# Patient Record
Sex: FEMALE | Race: WHITE | Employment: PART TIME | URBAN - METROPOLITAN AREA
[De-identification: names, ages, dates, MRNs, and addresses within clinical notes are randomized per-mention and may not be internally consistent; named-entity substitution may affect disease eponyms.]

---

## 2018-05-09 PROBLEM — H91.93 BILATERAL HEARING LOSS, UNSPECIFIED HEARING LOSS TYPE: Status: ACTIVE | Noted: 2018-05-09

## 2018-05-22 PROBLEM — H90.3 SENSORINEURAL HEARING LOSS (SNHL) OF BOTH EARS: Status: ACTIVE | Noted: 2018-05-22

## 2021-05-13 ENCOUNTER — OFFICE VISIT (OUTPATIENT)
Dept: ORTHOPEDICS CLINIC | Facility: CLINIC | Age: 86
End: 2021-05-13
Payer: MEDICARE

## 2021-05-13 VITALS — HEIGHT: 62 IN | WEIGHT: 157 LBS | BODY MASS INDEX: 28.89 KG/M2

## 2021-05-13 DIAGNOSIS — B35.1 ONYCHOMYCOSIS: Primary | ICD-10-CM

## 2021-05-13 PROCEDURE — 99213 OFFICE O/P EST LOW 20 MIN: CPT | Performed by: PODIATRIST

## 2021-05-13 NOTE — PROGRESS NOTES
[facility] Progress Note    Subjective:   Mrs Lila Romano is here with bilateral great toenail issues and pain with the left depending on the shoes she is in.   She has had fungus for years ago involving both great toenails and she thinks originally it w

## 2023-08-04 ENCOUNTER — HOSPITAL ENCOUNTER (INPATIENT)
Facility: HOSPITAL | Age: 88
LOS: 4 days | Discharge: HOME HEALTH | End: 2023-08-08
Attending: EMERGENCY MEDICINE | Admitting: HOSPITALIST
Payer: MEDICARE

## 2023-08-04 ENCOUNTER — APPOINTMENT (OUTPATIENT)
Dept: GENERAL RADIOLOGY | Facility: HOSPITAL | Age: 88
End: 2023-08-04
Attending: EMERGENCY MEDICINE
Payer: MEDICARE

## 2023-08-04 DIAGNOSIS — I95.9 SEPSIS WITH HYPOTENSION: Primary | ICD-10-CM

## 2023-08-04 DIAGNOSIS — A41.9 SEPSIS WITH HYPOTENSION: Primary | ICD-10-CM

## 2023-08-04 LAB
ALBUMIN SERPL-MCNC: 2.8 G/DL (ref 3.4–5)
ALBUMIN/GLOB SERPL: 0.7 {RATIO} (ref 1–2)
ALP LIVER SERPL-CCNC: 116 U/L
ALT SERPL-CCNC: 19 U/L
ANION GAP SERPL CALC-SCNC: 4 MMOL/L (ref 0–18)
AST SERPL-CCNC: 18 U/L (ref 15–37)
BASOPHILS # BLD AUTO: 0.03 X10(3) UL (ref 0–0.2)
BASOPHILS NFR BLD AUTO: 0.4 %
BILIRUB SERPL-MCNC: 0.2 MG/DL (ref 0.1–2)
BILIRUB UR QL STRIP.AUTO: NEGATIVE
BUN BLD-MCNC: 23 MG/DL (ref 7–18)
CALCIUM BLD-MCNC: 9.7 MG/DL (ref 8.5–10.1)
CHLORIDE SERPL-SCNC: 102 MMOL/L (ref 98–112)
CLARITY UR REFRACT.AUTO: CLEAR
CO2 SERPL-SCNC: 32 MMOL/L (ref 21–32)
COLOR UR AUTO: YELLOW
CREAT BLD-MCNC: 1.07 MG/DL
EGFRCR SERPLBLD CKD-EPI 2021: 49 ML/MIN/1.73M2 (ref 60–?)
EOSINOPHIL # BLD AUTO: 0.02 X10(3) UL (ref 0–0.7)
EOSINOPHIL NFR BLD AUTO: 0.3 %
ERYTHROCYTE [DISTWIDTH] IN BLOOD BY AUTOMATED COUNT: 12.9 %
GLOBULIN PLAS-MCNC: 3.8 G/DL (ref 2.8–4.4)
GLUCOSE BLD-MCNC: 150 MG/DL (ref 70–99)
GLUCOSE UR STRIP.AUTO-MCNC: NEGATIVE MG/DL
HCT VFR BLD AUTO: 29.1 %
HGB BLD-MCNC: 9.2 G/DL
IMM GRANULOCYTES # BLD AUTO: 0.04 X10(3) UL (ref 0–1)
IMM GRANULOCYTES NFR BLD: 0.6 %
KETONES UR STRIP.AUTO-MCNC: NEGATIVE MG/DL
LACTATE SERPL-SCNC: 1 MMOL/L (ref 0.4–2)
LACTATE SERPL-SCNC: 2.5 MMOL/L (ref 0.4–2)
LEUKOCYTE ESTERASE UR QL STRIP.AUTO: NEGATIVE
LYMPHOCYTES # BLD AUTO: 0.45 X10(3) UL (ref 1–4)
LYMPHOCYTES NFR BLD AUTO: 6.5 %
MCH RBC QN AUTO: 28 PG (ref 26–34)
MCHC RBC AUTO-ENTMCNC: 31.6 G/DL (ref 31–37)
MCV RBC AUTO: 88.4 FL
MONOCYTES # BLD AUTO: 0.54 X10(3) UL (ref 0.1–1)
MONOCYTES NFR BLD AUTO: 7.8 %
NEUTROPHILS # BLD AUTO: 5.82 X10 (3) UL (ref 1.5–7.7)
NEUTROPHILS # BLD AUTO: 5.82 X10(3) UL (ref 1.5–7.7)
NEUTROPHILS NFR BLD AUTO: 84.4 %
NITRITE UR QL STRIP.AUTO: NEGATIVE
OSMOLALITY SERPL CALC.SUM OF ELEC: 293 MOSM/KG (ref 275–295)
PH UR STRIP.AUTO: 7 [PH] (ref 5–8)
PLATELET # BLD AUTO: 276 10(3)UL (ref 150–450)
POTASSIUM SERPL-SCNC: 4.1 MMOL/L (ref 3.5–5.1)
PROT SERPL-MCNC: 6.6 G/DL (ref 6.4–8.2)
PROT UR STRIP.AUTO-MCNC: NEGATIVE MG/DL
RBC # BLD AUTO: 3.29 X10(6)UL
RBC UR QL AUTO: NEGATIVE
SARS-COV-2 RNA RESP QL NAA+PROBE: NOT DETECTED
SODIUM SERPL-SCNC: 138 MMOL/L (ref 136–145)
SP GR UR STRIP.AUTO: 1.01 (ref 1–1.03)
UROBILINOGEN UR STRIP.AUTO-MCNC: <2 MG/DL
WBC # BLD AUTO: 6.9 X10(3) UL (ref 4–11)

## 2023-08-04 PROCEDURE — 80053 COMPREHEN METABOLIC PANEL: CPT | Performed by: EMERGENCY MEDICINE

## 2023-08-04 PROCEDURE — 93005 ELECTROCARDIOGRAM TRACING: CPT

## 2023-08-04 PROCEDURE — 83605 ASSAY OF LACTIC ACID: CPT | Performed by: EMERGENCY MEDICINE

## 2023-08-04 PROCEDURE — 87040 BLOOD CULTURE FOR BACTERIA: CPT | Performed by: EMERGENCY MEDICINE

## 2023-08-04 PROCEDURE — 71045 X-RAY EXAM CHEST 1 VIEW: CPT | Performed by: EMERGENCY MEDICINE

## 2023-08-04 PROCEDURE — 85025 COMPLETE CBC W/AUTO DIFF WBC: CPT | Performed by: EMERGENCY MEDICINE

## 2023-08-04 PROCEDURE — 81003 URINALYSIS AUTO W/O SCOPE: CPT | Performed by: EMERGENCY MEDICINE

## 2023-08-04 RX ORDER — OXYBUTYNIN CHLORIDE 5 MG/1
5 TABLET ORAL 2 TIMES DAILY
Status: DISCONTINUED | OUTPATIENT
Start: 2023-08-04 | End: 2023-08-08

## 2023-08-04 RX ORDER — ACETAMINOPHEN 500 MG
500 TABLET ORAL EVERY 4 HOURS PRN
Status: DISCONTINUED | OUTPATIENT
Start: 2023-08-04 | End: 2023-08-08

## 2023-08-04 RX ORDER — SODIUM CHLORIDE 9 MG/ML
INJECTION, SOLUTION INTRAVENOUS CONTINUOUS
Status: DISCONTINUED | OUTPATIENT
Start: 2023-08-04 | End: 2023-08-05

## 2023-08-04 RX ORDER — HEPARIN SODIUM 5000 [USP'U]/ML
5000 INJECTION, SOLUTION INTRAVENOUS; SUBCUTANEOUS EVERY 8 HOURS SCHEDULED
Status: DISCONTINUED | OUTPATIENT
Start: 2023-08-04 | End: 2023-08-08

## 2023-08-04 RX ORDER — METOCLOPRAMIDE HYDROCHLORIDE 5 MG/ML
5 INJECTION INTRAMUSCULAR; INTRAVENOUS EVERY 8 HOURS PRN
Status: DISCONTINUED | OUTPATIENT
Start: 2023-08-04 | End: 2023-08-08

## 2023-08-04 RX ORDER — ONDANSETRON 2 MG/ML
4 INJECTION INTRAMUSCULAR; INTRAVENOUS EVERY 6 HOURS PRN
Status: DISCONTINUED | OUTPATIENT
Start: 2023-08-04 | End: 2023-08-08

## 2023-08-04 RX ORDER — NITROFURANTOIN 25; 75 MG/1; MG/1
100 CAPSULE ORAL NIGHTLY
COMMUNITY

## 2023-08-04 RX ORDER — FAMOTIDINE 20 MG/1
20 TABLET, FILM COATED ORAL DAILY
Status: DISCONTINUED | OUTPATIENT
Start: 2023-08-05 | End: 2023-08-08

## 2023-08-04 NOTE — ED INITIAL ASSESSMENT (HPI)
Per EMS, pt from home. EMS reports pt initially hypotensive 80's/50's. Pt c/o feeling weak and faint that started last night. Reports feeling dizzy with movement. Normally on 3L of oxygen nasal cannula, hx lung cancer-not currently undergoing treatment. A&ox4. Has pleur-x to left side of abdomen. Received 500mL of 0.9NS IVF bolus in route.

## 2023-08-04 NOTE — ED QUICK NOTES
Rounding Completed    Plan of Care reviewed. Waiting for results/XR. Elimination needs assessed. Bed is locked and in lowest position. Call light within reach.

## 2023-08-04 NOTE — ED QUICK NOTES
Telephone update given to pt's son Danilo Rizo via telephone per ok from pt. He would like to be called with updates. #[phone].

## 2023-08-05 LAB
ANION GAP SERPL CALC-SCNC: 0 MMOL/L (ref 0–18)
ATRIAL RATE: 90 BPM
BASOPHILS # BLD AUTO: 0.02 X10(3) UL (ref 0–0.2)
BASOPHILS NFR BLD AUTO: 0.3 %
BUN BLD-MCNC: 18 MG/DL (ref 7–18)
CALCIUM BLD-MCNC: 8.8 MG/DL (ref 8.5–10.1)
CHLORIDE SERPL-SCNC: 108 MMOL/L (ref 98–112)
CO2 SERPL-SCNC: 31 MMOL/L (ref 21–32)
CREAT BLD-MCNC: 0.7 MG/DL
EGFRCR SERPLBLD CKD-EPI 2021: 82 ML/MIN/1.73M2 (ref 60–?)
EOSINOPHIL # BLD AUTO: 0.03 X10(3) UL (ref 0–0.7)
EOSINOPHIL NFR BLD AUTO: 0.5 %
ERYTHROCYTE [DISTWIDTH] IN BLOOD BY AUTOMATED COUNT: 12.7 %
GLUCOSE BLD-MCNC: 115 MG/DL (ref 70–99)
HCT VFR BLD AUTO: 23.7 %
HGB BLD-MCNC: 7.5 G/DL
IMM GRANULOCYTES # BLD AUTO: 0.02 X10(3) UL (ref 0–1)
IMM GRANULOCYTES NFR BLD: 0.3 %
LYMPHOCYTES # BLD AUTO: 0.56 X10(3) UL (ref 1–4)
LYMPHOCYTES NFR BLD AUTO: 9.6 %
MAGNESIUM SERPL-MCNC: 2.3 MG/DL (ref 1.6–2.6)
MCH RBC QN AUTO: 28.1 PG (ref 26–34)
MCHC RBC AUTO-ENTMCNC: 31.6 G/DL (ref 31–37)
MCV RBC AUTO: 88.8 FL
MONOCYTES # BLD AUTO: 0.6 X10(3) UL (ref 0.1–1)
MONOCYTES NFR BLD AUTO: 10.2 %
NEUTROPHILS # BLD AUTO: 4.63 X10 (3) UL (ref 1.5–7.7)
NEUTROPHILS # BLD AUTO: 4.63 X10(3) UL (ref 1.5–7.7)
NEUTROPHILS NFR BLD AUTO: 79.1 %
OSMOLALITY SERPL CALC.SUM OF ELEC: 291 MOSM/KG (ref 275–295)
P AXIS: 30 DEGREES
P-R INTERVAL: 120 MS
PLATELET # BLD AUTO: 209 10(3)UL (ref 150–450)
POTASSIUM SERPL-SCNC: 4.2 MMOL/L (ref 3.5–5.1)
PROCALCITONIN SERPL-MCNC: <0.05 NG/ML (ref ?–0.16)
Q-T INTERVAL: 372 MS
QRS DURATION: 70 MS
QTC CALCULATION (BEZET): 455 MS
R AXIS: 1 DEGREES
RBC # BLD AUTO: 2.67 X10(6)UL
SODIUM SERPL-SCNC: 139 MMOL/L (ref 136–145)
T AXIS: 34 DEGREES
VENTRICULAR RATE: 90 BPM
WBC # BLD AUTO: 5.9 X10(3) UL (ref 4–11)

## 2023-08-05 PROCEDURE — 84145 PROCALCITONIN (PCT): CPT | Performed by: HOSPITALIST

## 2023-08-05 PROCEDURE — 83735 ASSAY OF MAGNESIUM: CPT | Performed by: HOSPITALIST

## 2023-08-05 PROCEDURE — 80048 BASIC METABOLIC PNL TOTAL CA: CPT | Performed by: HOSPITALIST

## 2023-08-05 PROCEDURE — 85025 COMPLETE CBC W/AUTO DIFF WBC: CPT | Performed by: HOSPITALIST

## 2023-08-05 NOTE — ED QUICK NOTES
Rounding Completed    Plan of Care reviewed. Waiting for inpatient room to be clean. Elimination needs assessed. Provided cup of ice water as requested per ok from ERMD.    Bed is locked and in lowest position. Call light within reach. hard copy, drawn during this pregnancy

## 2023-08-05 NOTE — ED QUICK NOTES
Patient assisted to bedside comode. No complaints of dizziness or weakness. Patient ambulatory with assistance of tech. Urine sent down to lab.

## 2023-08-05 NOTE — ED QUICK NOTES
Rounding Completed    Plan of Care reviewed. Waiting for disposition. Elimination needs assessed. Bed is locked and in lowest position. Call light within reach.

## 2023-08-05 NOTE — PLAN OF CARE
Patient up with standby assistance, denies dizziness , Respirations unlabored, left lung sound diminished, right lung clear, O2 sat 99% on O2 3l/nc. [place] Pleurx catheter intact, dressing changed. BP 637T- 486'Q systolic. Patient with poor appetite. Patient's vital signs  stable.

## 2023-08-05 NOTE — PROGRESS NOTES
NURSING ADMISSION NOTE    Patient is A/O x 4 admitted for weakness and hypotension. VSS and afebrile while on floor. Navigator completed, MD done med rec and ordered to hold Popejoy Riley. Tagrisso medication bottle for Lung CA returned to patient as demanded, refused to surrender to pharmacy; Son to bring home at am. Frequent clear white thin sputum, deneis any SOB. Needs addressed. Fall prec in place. Call light within reach. Patient admitted via [address] to room. Safety precautions initiated. Bed in low position. Call light in reach.

## 2023-08-05 NOTE — ED QUICK NOTES
Orders for admission, patient is aware of plan and ready to go upstairs. Any questions, please call ED RN Ora at extension [phone].      Patient Covid vaccination status: Fully vaccinated     COVID Test Ordered in ED: Rapid SARS-CoV-2 by PCR    COVID Suspicion at Admission: N/A    Running Infusions: None    Mental Status/LOC at time of transport: a&ox4    Other pertinent information: wears home o2 3L nasal cannula, hx lung cancer not being treated, pleur-x to left abdomen, was hypotensive PTA  CIWA score: N/A   NIH score:  N/A

## 2023-08-05 NOTE — PROGRESS NOTES
[facility] Pharmacy Note:  Renal Dose Adjustment    Monique Hill has been prescribed famotidine (PEPCID) 20 mg orally every 12 hours. Estimated Creatinine Clearance: 25.1 mL/min (A) (based on SCr of 1.07 mg/dL (H)). Calculated creatinine clearance is < 50 ml/min, therefore the dose of famotidine (Pepcid) has been changed to 20 mg orally every 24 hours per P&T approved protocol. Pharmacy will continue to follow, and if renal function improves, will resume the original order.     Thank you,  Stefany Abbasi, PharmD  8/4/2023 11:02 PM

## 2023-08-06 LAB — C DIFF TOX B STL QL: NEGATIVE

## 2023-08-06 PROCEDURE — 97116 GAIT TRAINING THERAPY: CPT

## 2023-08-06 PROCEDURE — 97530 THERAPEUTIC ACTIVITIES: CPT

## 2023-08-06 PROCEDURE — 87493 C DIFF AMPLIFIED PROBE: CPT | Performed by: HOSPITALIST

## 2023-08-06 PROCEDURE — 97161 PT EVAL LOW COMPLEX 20 MIN: CPT

## 2023-08-06 NOTE — PLAN OF CARE
Patient with occasional productive cough, respirations unlabored, O2 sat 98%-99% on O2 3l/nc. Patient afebrile. Patient c/o dizziness when she got of bed with PT  this morning. 'F-628'L systolic. Patient with poor appetite. 1900- Patient had 1 loose stool, stool sent for Cdiff.

## 2023-08-06 NOTE — PHYSICAL THERAPY NOTE
PHYSICAL THERAPY EVALUATION - INPATIENT     Room Number: 696/540-Q  Evaluation Date: 8/6/2023  Type of Evaluation: Initial  Physician Order: PT Eval and Treat    Presenting Problem: weakness x 3 wks, hypotension  Co-Morbidities : Stage IV Lung Ca, PE  Reason for Therapy: Mobility Dysfunction and Discharge Planning    History related to current admission: Patient is a 80year old female admitted on 8/4/2023 from home for weakness x 3 weeks, hypotension. Pt diagnosed with sepsis with hypotension. 8/4 Chest X-ray:  1. Small to moderate likely left pleural effusion despite a left PleurX catheter. 2.  Diffuse opacity throughout the left lung could be due to pneumonia or neoplastic disease. 3. Mild interstitial edema. ASSESSMENT   In this PT evaluation, the patient presents with the following impairments: activity tolerance, gait tolerance, independence during functional mobility tasks; patient reported feeling lightheaded in sitting but BP was stable. These impairments and comorbidities manifest themselves as functional limitations in independent bed mobility, transfers, and gait. The patient is below baseline and would benefit from skilled inpatient PT to address the above deficits to assist patient in returning to prior to level of function. Functional outcome measures completed include [facility]. The AM-PAC '6-Clicks' Inpatient Basic Mobility Short Form was completed and this patient is demonstrating a Approx Degree of Impairment: 46.58%  degree of impairment in mobility. Research supports that patients with this level of impairment may benefit from [address] DISCHARGE RECOMMENDATIONS  PT Discharge Recommendations: Home with home health PT    PLAN  PT Treatment Plan: Bed mobility; Patient education;Gait training;Range of motion;Strengthening;Transfer training  Rehab Potential : Fair  Frequency (Obs): 3-5x/week         CURRENT GOALS    Goal #1 Patient is able to demonstrate supine - sit EOB @ level: modified independent     Goal #2 Patient is able to demonstrate transfers Sit to/from Stand at assistance level: supervision     Goal #3 Patient is able to ambulate 50 feet with assist device: walker - rolling at assistance level: supervision     Goal #4    Goal #5    Goal #6    Goal Comments: Goals established on 2023    HOME SITUATION  Type of Home: House   Home Layout: Two level; Able to live on main level                Lives With: Son;Caregiver part-time (Caregiver 11-5 M-Sat)     Patient Owned Equipment: Rolling walker  Patient Regularly Uses: Glasses    Prior Level of [place]: Patient reports using a RW at home for gait, is on O2 via NC at all times. Patient reported she has not ambulated much lately due to not feeling good and being lightheaded. SUBJECTIVE  \"I can get up with you\"      OBJECTIVE  Precautions: Bed/chair alarm  Fall Risk: Standard fall risk    WEIGHT BEARING RESTRICTION  Weight Bearing Restriction: None                PAIN ASSESSMENT  Ratin          COGNITION  Overall Cognitive Status:  WFL - within functional limits    RANGE OF MOTION AND STRENGTH ASSESSMENT  Upper extremity ROM and strength are within functional limits     Lower extremity ROM is within functional limits     Lower extremity strength is within functional limits       BALANCE  Static Sitting: Good  Dynamic Sitting: Good  Static Standing: Fair (with RW)  Dynamic Standing: Fair (with RW)    ADDITIONAL TESTS                                    ACTIVITY TOLERANCE                         O2 WALK       NEUROLOGICAL FINDINGS                        AM-PAC '6-Clicks' INPATIENT SHORT FORM - BASIC MOBILITY  How much difficulty does the patient currently have. ..   Patient Difficulty: Turning over in bed (including adjusting bedclothes, sheets and blankets)?: A Little   Patient Difficulty: Sitting down on and standing up from a chair with arms (e.g., wheelchair, bedside commode, etc.): A Little   Patient Difficulty: Moving from lying on back to sitting on the side of the bed?: A Little   How much help from another person does the patient currently need. .. Help from Another: Moving to and from a bed to a chair (including a wheelchair)?: A Little   Help from Another: Need to walk in hospital room?: A Little   Help from Another: Climbing 3-5 steps with a railing?: A Little       AM-PAC Score:  Raw Score: 18   Approx Degree of Impairment: 46.58%   Standardized Score (AM-PAC Scale): 43.63   CMS Modifier (G-Code): CK    FUNCTIONAL ABILITY STATUS  Gait Assessment   Functional Mobility/Gait Assessment  Gait Assistance: Contact guard assist  Distance (ft): 10  Assistive Device: Rolling walker  Pattern:  (Decr camilo/step length/heel-toe pattern)    Skilled Therapy Provided     Bed Mobility:  Rolling: cga-SBA with HOB slightly elevated  Supine to sit: cga-SBA with HOB elevated   Sit to supine: NT    Transfer Mobility:  Sit to stand: cga with the RW   Stand to sit: cga  Gait = cga with the RW    Therapist's Comments: RN approved session, patient was received in bed with HOB slightly elevated. Patient performed supine->sit without difficulty observed but needed increase in time, BP sitting at /43, patient reported some lightheadedness. Patient performed sit->stand with the RW and ambulated to the recliner chair around the bed, stand->sit cga; patient was encouraged to sit up in the chair as able. BP sitting in the recliner chair 95/42 then 106/45. Exercise/Education Provided:  Discussed role of PT, goals for the session, POC and discharge recommendation. Patient performed supine LE exs (heel slides, SLR, hip abd/add, ankle pumps) and LAQ. Patient was educated on activity recommendation and encouraged patient to do exs and ambulate with staff to the bathroom, emphasized need to call staff for any mobility to decrease fall risk; patient verbalized understanding.      Patient End of Session: Up in chair;Needs met;Call light within reach;RN aware of session/findings; All patient questions and concerns addressed; Alarm set      Patient Evaluation Complexity Level:  History Moderate - 1 or 2 personal factors and/or co-morbidities   Examination of body systems Low - addressing 1-2 elements   Clinical Presentation Moderate - Evolving   Clinical Decision Making Moderate - Evolving       PT Session Time: 35 minutes  Gait Trainin minutes  Therapeutic Activity: 12 minutes  Neuromuscular Re-education:  minutes  Therapeutic Exercise: 8 minutes

## 2023-08-06 NOTE — PROGRESS NOTES
Patient alert x4, VSS. No complaint of pain. Ambulates with assistance to bathroom. No dizziness. Declined heparin during shift. IV abx given and tolerated well. Oral chemo on hold. Oncology to see pt in AM. Bed alarm on. Call light within reach.

## 2023-08-07 LAB — PLATELET # BLD AUTO: 183 10(3)UL (ref 150–450)

## 2023-08-07 PROCEDURE — 99211 OFF/OP EST MAY X REQ PHY/QHP: CPT

## 2023-08-07 PROCEDURE — 85049 AUTOMATED PLATELET COUNT: CPT | Performed by: INTERNAL MEDICINE

## 2023-08-07 NOTE — PLAN OF CARE
Received patient drowsy, awakens easily. On O2 at 3L, breath sounds diminished with occasional productive cough. On tele, SR. Refused Heparin sub q. Offered no c/o pain. Up to the bathroom with standby assist and walker.

## 2023-08-07 NOTE — DIETARY NOTE
[facility]   CLINICAL NUTRITION    Calorie count update:  8/5:  B- 350 kcal, 8 gm pro  L- 389 kcal, 23 gm pro  D- 390 kcal, 21 gm pro    8/6:  B- 350 kcal, 20 gm pro  L- 170 kcal, 6 gm pro  D- nothing recorded    8/7:  B- pending  L- pending  D- pending    So far, pt consuming, on average, ~330 kcal and 16 gm protein per meal which meets ~61% kcal and 89% protein needs. Will follow up tmrw to complete calorie count and complete re-assessment.     Pari Gibson, [address], [address] [place] [address]   Clinical Dietitian  Spectra: [phone]

## 2023-08-07 NOTE — CONSULTS
[facility]  Report of Inpatient Wound Care Consultation    Haylee Pan Patient Status:  Inpatient    1932 MRN [id number]   [facility] 4NW-A Attending Maral Dailey MD   Hosp Day # 3 PCP Robert Gregory DO     Reason for Consultation:  Sacrum    History of Present Illness:  Haylee Pan is a a(n) 80year old female. No open wound at this time. Pt complains \" my buttocks is sore\". History:  Past Medical History:   Diagnosis Date    Cancer ([address]     Cough 10/21/2011    Elevated fasting glucose 2015    GERD (gastroesophageal reflux disease) 2012    HTN (hypertension) 10/21/2011    Hyperlipemia 10/21/2011    Osteopenia 2015    Vitamin D deficiency 10/21/2011     Past Surgical History:   Procedure Laterality Date    APPENDECTOMY      BENIGN BIOPSY LEFT  late     D & C      OTHER SURGICAL HISTORY      L breast bx neg    PLEURX CATHETER Left       reports that she has never smoked. She has never used smokeless tobacco. She reports current alcohol use. She reports that she does not use drugs. Allergies:  @ALLERGY    Laboratory Data:         Impression:  Wound 23 Pressure Injury Sacrum (Active)   Date First Assessed/Time First Assessed: 23 1848   Present on Original Admission: Yes  Primary Wound Type: Pressure Injury  Location: Sacrum      Assessments 2023 10:57 AM   Wound Image     Drainage Amount None   Wound Odor None   Shape No open wound at this time. Blanchable erythema. Wound Cleaning and Dressings:  Showering directions:   Wound cleansing:  Cleanse with mild soap and wate  Wound product: Apply Z guard skin protectant daily as needed particularly after each hipolito care. Miscellaneous/Additional Orders:  Offloading: Turn Q 2 hours and as needed, waffle cushion for the chair    Care Summary: Discussed Plan of Care at beside with patient.  Patient verbally acknowledges understanding of all instructions and all questions were answered. Thank you for allowing me to participate in the care of your patient. Time Spent 15 minutes  Thank you.     Sraah Bass RN, BSN [facility]   Wound Care pager [phone]  8/7/2023  10:59 AM

## 2023-08-08 VITALS
RESPIRATION RATE: 18 BRPM | DIASTOLIC BLOOD PRESSURE: 46 MMHG | HEIGHT: 60 IN | SYSTOLIC BLOOD PRESSURE: 101 MMHG | WEIGHT: 122 LBS | HEART RATE: 97 BPM | TEMPERATURE: 98 F | OXYGEN SATURATION: 84 % | BODY MASS INDEX: 23.95 KG/M2

## 2023-08-08 LAB
ANION GAP SERPL CALC-SCNC: 2 MMOL/L (ref 0–18)
BASOPHILS # BLD AUTO: 0.02 X10(3) UL (ref 0–0.2)
BASOPHILS NFR BLD AUTO: 0.4 %
BUN BLD-MCNC: 14 MG/DL (ref 7–18)
CALCIUM BLD-MCNC: 9 MG/DL (ref 8.5–10.1)
CHLORIDE SERPL-SCNC: 107 MMOL/L (ref 98–112)
CO2 SERPL-SCNC: 32 MMOL/L (ref 21–32)
CREAT BLD-MCNC: 0.65 MG/DL
EGFRCR SERPLBLD CKD-EPI 2021: 84 ML/MIN/1.73M2 (ref 60–?)
EOSINOPHIL # BLD AUTO: 0.1 X10(3) UL (ref 0–0.7)
EOSINOPHIL NFR BLD AUTO: 1.9 %
ERYTHROCYTE [DISTWIDTH] IN BLOOD BY AUTOMATED COUNT: 13.2 %
GLUCOSE BLD-MCNC: 109 MG/DL (ref 70–99)
HCT VFR BLD AUTO: 24.9 %
HGB BLD-MCNC: 8 G/DL
IMM GRANULOCYTES # BLD AUTO: 0.02 X10(3) UL (ref 0–1)
IMM GRANULOCYTES NFR BLD: 0.4 %
LYMPHOCYTES # BLD AUTO: 0.59 X10(3) UL (ref 1–4)
LYMPHOCYTES NFR BLD AUTO: 11.3 %
MAGNESIUM SERPL-MCNC: 2.3 MG/DL (ref 1.6–2.6)
MCH RBC QN AUTO: 28.1 PG (ref 26–34)
MCHC RBC AUTO-ENTMCNC: 32.1 G/DL (ref 31–37)
MCV RBC AUTO: 87.4 FL
MONOCYTES # BLD AUTO: 0.59 X10(3) UL (ref 0.1–1)
MONOCYTES NFR BLD AUTO: 11.3 %
NEUTROPHILS # BLD AUTO: 3.92 X10 (3) UL (ref 1.5–7.7)
NEUTROPHILS # BLD AUTO: 3.92 X10(3) UL (ref 1.5–7.7)
NEUTROPHILS NFR BLD AUTO: 74.7 %
OSMOLALITY SERPL CALC.SUM OF ELEC: 293 MOSM/KG (ref 275–295)
PLATELET # BLD AUTO: 188 10(3)UL (ref 150–450)
POTASSIUM SERPL-SCNC: 3.9 MMOL/L (ref 3.5–5.1)
RBC # BLD AUTO: 2.85 X10(6)UL
SODIUM SERPL-SCNC: 141 MMOL/L (ref 136–145)
WBC # BLD AUTO: 5.2 X10(3) UL (ref 4–11)

## 2023-08-08 PROCEDURE — 85025 COMPLETE CBC W/AUTO DIFF WBC: CPT | Performed by: INTERNAL MEDICINE

## 2023-08-08 PROCEDURE — 83735 ASSAY OF MAGNESIUM: CPT | Performed by: INTERNAL MEDICINE

## 2023-08-08 PROCEDURE — 80048 BASIC METABOLIC PNL TOTAL CA: CPT | Performed by: INTERNAL MEDICINE

## 2023-08-08 RX ORDER — AMOXICILLIN AND CLAVULANATE POTASSIUM 875; 125 MG/1; MG/1
1 TABLET, FILM COATED ORAL 2 TIMES DAILY
Qty: 4 TABLET | Refills: 0 | Status: SHIPPED | OUTPATIENT
Start: 2023-08-08 | End: 2023-08-10

## 2023-08-08 RX ORDER — MULTIPLE VITAMINS W/ MINERALS TAB 9MG-400MCG
1 TAB ORAL DAILY
Status: DISCONTINUED | OUTPATIENT
Start: 2023-08-08 | End: 2023-08-08

## 2023-08-08 NOTE — DISCHARGE INSTRUCTIONS
Sometimes managing your health at home requires assistance. The [facility] team has recognized your preference to use [address]. They can be reached by phone at [phone]. The fax number for your reference is [phone]. . A representative from the home health agency will contact you or your family to schedule your first visit. Wound Cleaning and Dressings:  Showering directions:   Wound cleansing:  Cleanse with mild soap and wate  Wound product: Apply Z guard skin protectant daily as needed particularly after each hipolito care.

## 2023-08-08 NOTE — PROGRESS NOTES
Patient alert x4, VSS. No complaint of pain during shift. All meds given per [facility]. Pt declined heparin. Pt started new IV abx. Given and tolerated well. Ambulates with assistance to bathroom. Calorie count. Bed alarm on. Call light within reach. Rounded on frequently.

## 2023-08-08 NOTE — PROGRESS NOTES
Took over patient's care at 1230. Received awake, alert and oriented x 4. Patient on Oxygen at 3L/NC, noted to have occasional cough. Declined to take Heparin subcutaneous.

## 2023-08-08 NOTE — DISCHARGE SUMMARY
[facility] Internal Medicine Discharge Summary   Patient ID:  Maria Antonia Conway  [id number]  80year old  9/16/1932    Admit date: 8/4/2023    Discharge date and time: 8/8/2023     Attending Physician: Gaudencio Ramirez MD     Primary Care Physician: Trudi Malik DO     Admit Dx: Sepsis with hypotension  [A41.9, I95.9]    Hospital Discharge Diagnoses:    Sepsis with hypotension      Disposition: home  - lives c son    Important follow up:  -PCP in [x] within 7 days [] within 14 days [] other    Jordy DO Deshawn  [address]     Schedule an appointment as soon as possible for a visit in 1 week(s)        Please refer to prior H&P by Chantel Almazan MD   on 8/5/2023      History of Present Illness: Maria Antonia Conway is a 80year old female with metastatic lung CA who presented with progressive weakness x 3 weeks. She previously presented to [facility] for the same & was treated for a UTI & discharged home. She presented for evaluation when she was unable to stand unassisted. Per EMS report she was hypotensive on arrival but on presentation to the ED she was afebrile and hemodynamically stable. Labs were unremarkable save for a lactate of 2.5 which resolved with IVF. CXR showed small-mod left pleural effusion w/ noted L PleurX catheter & \"diffuse opacity throughout the left lung could be due to pneumonia or neoplastic disease. \" A UA was normal. Blood cultures were drawn. She was given empiric antibiotics & IVF and admitted for further evaluation. Overnight there were no acute events and she remained afebrile and hemodynamically stable. She reports feeling improved following IV fluids and her appetite was better today. She.  Is not yet attempted to get out of bed. She is asking if she can resume her dietary supplements this is takes boost at home 3 times daily). She also wants to resume her Tagrisso.       Hospital Course:   Maria Antonia Conway Is a a 80year old female who presented with weakness and hypotension     Problem List / Diagnoses     Weakness  hypotension  Possible sepsis  Metastatic lung cancer  Malignant effusion s/p Pleurx catheter  GERD     Plan     Weakness  -Differential includes secondary to infection, hypotension, malnutrition  - Suspicion lower for active infection (see below)  -Dietary consult for possible malnutrition  - PT/OT --> Home with home health PT   -- deconditioning contributing      Hypotension  -Resolved following sepsis fluid bolus  -Stop maintenance IV fluids, CTM     Possible sepsis  -Hypotensive with elevated lactate on admission but both normalized with fluids  -Chest x-ray showed left lung opacities that was read as either pneumonia or secondary to her known  lung cancer  -Procalcitonin normal, pneumonia less likely but improved c abx  -Blood cultures drawn, pending, NG x3d  - Will continue empiric antibiotics pending negative cultures; dc c augmentin x2 more days   - soft stool may be 2/2 abx     Metastatic lung cancer; Chronic respiratory failure  -Currently on Tagrisso  - Primary oncologist based out of [facility], apprec [facility] oncology consult   - the patient previously saw Dr. Carlos Holbrook and [facility]. - on 3L at baseline, stable      Malignant effusion s/p Pleurx catheter  -Drain prn     GERD  -Famotidine     DVT Mechanical Prophylaxis:   SCDs,          DVT Pharmacologic Prophylaxis   Medication    heparin (Porcine) 5000 UNIT/ML injection 5,000 Units            FEN-encourage nutritional supplements        Day of discharge Exam    08/08/23  1454   BP:    Pulse: 97   Resp:    Temp:        Exam on day of discharge:  Walked to bathroom. Drinking ensure.  Soft stool x2    Gen: No acute distress, alert and oriented  CV: RRR, +s1/s2  Lungs: CTAB, good respiratory effort  Abdomen: s/nt/nd  Ext: Moves all 4 extremities, no c/c/e  Neuro: CN Intact, no focal deficits      Discharge meds     Medication List        START taking these medications amoxicillin clavulanate 875-125 MG Tabs  Commonly known as: Augmentin  Take 1 tablet by mouth 2 (two) times daily for 2 days. CONTINUE taking these medications      albuterol 108 (90 Base) MCG/ACT Aers  Commonly known as: Ventolin HFA  Inhale 2 puffs into the lungs every 6 (six) hours as needed for Wheezing. CALCIUM + D OR     famotidine 20 MG Tabs  Commonly known as: Pepcid  Take 1 tablet (20 mg total) by mouth 2 (two) times daily. nitrofurantoin monohydrate macro 100 MG Caps  Commonly known as: Macrobid     NON FORMULARY     oxybutynin 5 MG Tabs  Commonly known as: Ditropan  Take 1 tablet (5 mg total) by mouth 2 (two) times daily. VITAMIN D (CHOLECALCIFEROL) OR               Where to Get Your Medications        These medications were sent to [facility], [address] [place] [address], [address] [place] [address], [address] [place] [address]      Phone: [phone]   amoxicillin clavulanate 875-125 MG Tabs         Consults: IP CONSULT TO HOSPITALIST  IP CONSULT TO SOCIAL WORK  IP CONSULT TO FOOD AND NUTRITION SERVICES  IP CONSULT TO ONCOLOGY  IP CONSULT TO SOCIAL WORK  CONSULT TO WOUND OSTOMY  IP CONSULT TO SOCIAL WORK    Radiology: XR CHEST AP PORTABLE  (CPT=71045)    Result Date: 8/4/2023  PROCEDURE:  XR CHEST AP PORTABLE  (CPT=71045)  TECHNIQUE:  AP chest radiograph was obtained. COMPARISON:  None. INDICATIONS:  weakness, hypotension  PATIENT STATED HISTORY: (As transcribed by Technologist)  Patient offered no additional history at this time. FINDINGS:  Heart is normal in size. There is a left-sided PleurX catheter. There is a small to moderate loculated left pleural effusion. There is opacity throughout the left lung which may be due to pneumonia or neoplastic disease. There is mild interstitial edema. CONCLUSION:   1. Small to moderate likely left pleural effusion despite a left PleurX catheter.   2.  Diffuse opacity throughout the left lung could be due to pneumonia or neoplastic disease. 3. Mild interstitial edema. LOCATION:  [facility]      Dictated by (CST): Jessica Bullard MD on 8/04/2023 at 6:13 PM     Finalized by (CST): Jessica Bullard MD on 8/04/2023 at 6:14 PM          Operative Procedures:      Code Status: DNAR/Selective Treatment    Total Time Coordinating Care: Greater than 30 minutes    Patient had opportunity to ask questions and state understand and agree with therapeutic plan as outlined. I reviewed and reconciled current and discharge medications on day of discharge and discussed meds with patient. D/w RNs and care giver.       Kerline Villalpando MD  [facility]  [phone]  8/8/2023  2:56 PM

## 2023-08-08 NOTE — PROGRESS NOTES
NURSING DISCHARGE NOTE    Discharged Home via Wheelchair. Accompanied by  her Caregiver  Belongings Taken by patient/family. Patient's own Kareem Ketan medication in a bottle returned to patient. Discharge instructions discussed with patient, she verbalized understanding.

## 2023-08-08 NOTE — CM/SW NOTE
08/08/23 1300   CM/SW Referral Data   Referral Source Social Work (self-referral)   Reason for Referral Discharge planning   Informant Patient   Patient [address]   Patient lives with   (Caregiver)   Patient Status Prior to Admission   Independent with ADLs and Mobility Yes   Discharge Needs   Anticipated D/C needs Caregiver services; Home health care     SW met with patient at bedside to discuss DC planning needs. Patient reported that she has a private duty caregiver everyday from 11am-5pm and that she has all necessary DME at home. Patient also stated that she is current with [facility] and would like to continue services with them at [place] for RN services only. Patient stated that she has had PT in the past and knows all the exercises to do. SW sent resume of care orders to [facility] via [facility] and requested they resume services with patient at [place]. SW will continue to follow for plan of care changes and remain available for any additional DC needs or concerns.      Cody Han MSW, [facility]  Discharge Planner   [id number]

## 2023-08-08 NOTE — PLAN OF CARE
Pt a/o x4. VSS, remained afebrile. Remained on 3L O2 (baseline). Drowsy throughout day. Meds given per MAR. Up with SBA and walker to bathroom. No complaints of pain. Sputum clear white. Safety precautions in place. Call light within reach.      Problem: GASTROINTESTINAL - ADULT  Goal: Maintains adequate nutritional intake (undernourished)  Description: INTERVENTIONS:  - Monitor percentage of each meal consumed  - Identify factors contributing to decreased intake, treat as appropriate  - Assist with meals as needed  - Monitor I&O, WT and lab values  - Obtain nutritional consult as needed  - Optimize oral hygiene and moisture  - Encourage food from home; allow for food preferences  - Enhance eating environment  Outcome: Progressing     Problem: MUSCULOSKELETAL - ADULT  Goal: Return mobility to safest level of function  Description: INTERVENTIONS:  - Assess patient stability and activity tolerance for standing, transferring and ambulating w/ or w/o assistive devices  - Assist with transfers and ambulation using safe patient handling equipment as needed  - Ensure adequate protection for wounds/incisions during mobilization  - Obtain PT/OT consults as needed  - Advance activity as appropriate  - Communicate ordered activity level and limitations with patient/family  Outcome: Progressing